# Patient Record
Sex: MALE | Race: WHITE
[De-identification: names, ages, dates, MRNs, and addresses within clinical notes are randomized per-mention and may not be internally consistent; named-entity substitution may affect disease eponyms.]

---

## 2018-02-01 ENCOUNTER — HOSPITAL ENCOUNTER (EMERGENCY)
Dept: HOSPITAL 80 - CED | Age: 28
Discharge: HOME | End: 2018-02-01
Payer: MEDICAID

## 2018-02-01 VITALS — DIASTOLIC BLOOD PRESSURE: 62 MMHG | HEART RATE: 65 BPM | SYSTOLIC BLOOD PRESSURE: 134 MMHG

## 2018-02-01 VITALS — TEMPERATURE: 98 F | RESPIRATION RATE: 18 BRPM

## 2018-02-01 VITALS — OXYGEN SATURATION: 96 %

## 2018-02-01 DIAGNOSIS — L29.9: Primary | ICD-10-CM

## 2018-02-01 LAB — PLATELET # BLD: 266 10^3/UL (ref 150–400)

## 2018-02-08 ENCOUNTER — HOSPITAL ENCOUNTER (EMERGENCY)
Dept: HOSPITAL 80 - FED | Age: 28
Discharge: HOME | End: 2018-02-08
Payer: MEDICAID

## 2018-02-08 VITALS
DIASTOLIC BLOOD PRESSURE: 76 MMHG | OXYGEN SATURATION: 95 % | TEMPERATURE: 97.5 F | RESPIRATION RATE: 16 BRPM | SYSTOLIC BLOOD PRESSURE: 124 MMHG | HEART RATE: 92 BPM
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
## 2018-02-08 DIAGNOSIS — S93.402A: Primary | ICD-10-CM

## 2018-02-08 PROCEDURE — L4386 NON-PNEUM WALK BOOT PRE CST: HCPCS

## 2018-02-08 NOTE — EDPHY
H & P


Stated Complaint: Poss broken/sprained L ankle


HPI/ROS: 





HPI





CHIEF COMPLAINT:  Left ankle pain, left ankle swelling.





HISTORY OF PRESENT ILLNESS:  Patient is a 27-year-old male, otherwise healthy 

no significant medical history does not take any daily medications he presents 

emergency room with left lateral ankle pain and swelling. He states he was 

playing soccer and rolled his ankle now has left lateral ankle pain swelling. 

He is able to bear weight but has discomfort.  Denies any other areas of pain. 

No knee pain. No foot pain.





Past Medical History:  No significant medical history





Past Surgical History:  No significant surgical history





Social History:  Denies daily use of drugs alcohol tobacco





Family History:  Noncontributory








ROS   


REVIEW OF SYSTEMS:


A comprehensive 10 point review of systems is otherwise negative aside from 

elements mentioned in the history of present illness.








Exam   


Constitutional   triage nursing summary reviewed, vital signs reviewed, awake/

alert. 


Eyes   normal conjunctivae and sclera, EOMI, PERRLA. 


HENT   normal inspection, atraumatic, moist mucus membranes, no epistaxis, neck 

supple/ no meningismus, no raccoon eyes. 


Respiratory   clear to auscultation bilaterally, normal breath sounds, no 

respiratory distress, no wheezing. 


Cardiovascular   rate normal, regular rhythm, no murmur, no edema, distal 

pulses normal. 


Gastrointestinal   soft, non-tender, no rebound, no guarding, normal bowel 

sounds, no distension, no pulsatile mass. 


Genitourinary   no CVA tenderness. 


Musculoskeletal left lower extremity:  Good distal pulse.  Good cap refill.  

Swelling and tenderness over the left lateral malleolus.  Full range of motion 

of left ankle.  no midline vertebral tenderness, full range of motion, no calf 

swelling, no tenderness of extremities, no meningismus, good pulses, 

neurovascularly intact.


Skin   pink, warm, & dry, no rash, skin atraumatic. 


Neurologic   awake, alert and oriented x 3, AAOx3, moves all 4 extremities 

equally, motor intact, sensory intact, CN II-XII intact, normal cerebellar, 

normal vision, normal speech. 


Psychiatric   normal mood/affect. 


Heme/Lymph/Immune   no lymphadenopathy.





Differential Diagnosis:  Includes but is not limited to in a particular order, 

ankle sprain high-grade, ankle contusion, ankle fracture, chip fracture, ankle 

dislocation





Medical Decision Making:  Plan for this patient x-ray left ankle, [place] for 

pain control here in emergency room, ice pack.





Re-evaluation:





Will plan on giving crutches, walking boot, Norco for pain control at home and 

ibuprofen.  Referral to Orthopedics.  He has high-grade ankle sprain.  Will 

need follow-up.








X-ray reviewed of the left ankle.  Soft tissue swelling noted. No dislocation.  

No fracture.  Image interpreted by myself.





Walking boot and crutches provided.





Orthopedic referral given.


Source: Patient





- Personal History


Current Tetanus/Diphtheria Vaccine: Yes


Current Tetanus Diphtheria and Acellular Pertussis (TDAP): Yes





- Medical/Surgical History


Hx Asthma: No


Hx Chronic Respiratory Disease: No


Hx Diabetes: No


Hx Cardiac Disease: No


Hx Renal Disease: No


Hx Cirrhosis: No


Hx Alcoholism: No


Hx HIV/AIDS: No


Hx Splenectomy or Spleen Trauma: No


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





- Social History


Smoking Status: Never smoked


Constitutional: 


 Initial Vital Signs











Temperature (C)  36.4 C   02/08/18 19:53


 


Heart Rate  92   02/08/18 19:53


 


Respiratory Rate  16   02/08/18 19:53


 


Blood Pressure  124/76 H  02/08/18 19:53


 


O2 Sat (%)  95   02/08/18 19:53








 











O2 Delivery Mode               Room Air














Allergies/Adverse Reactions: 


 





Penicillins Allergy (Verified 02/08/18 19:57)


 


sulfamethoxazole [From Septra] Allergy (Verified 02/08/18 19:57)


 


trimethoprim [From Septra] Allergy (Verified 02/08/18 19:57)


 








Home Medications: 














 Medication  Instructions  Recorded


 


Hydrocodone/APAP 5/325 [Norco 1 - 2 tab PO Q4H PRN #10 tab 02/08/18





5/325]  


 


Ibuprofen [Motrin (*)] 800 mg PO Q6-8PRN #10 tab 02/08/18














Medical Decision Making





- Data Points


Medications Given: 


 








Discontinued Medications





Hydrocodone Bitart/Acetaminophen (Norco 5/325mg Prepack#6)  1 btl TAKEHOME 

EDNOW ONE


   Stop: 02/08/18 20:24


   Last Admin: 02/08/18 20:37 Dose:  1 btl


Hydrocodone Bitart/Acetaminophen (Norco 5/325)  1 tab PO EDNOW ONE


   Stop: 02/08/18 20:24


   Last Admin: 02/08/18 20:39 Dose:  Not Given


Hydrocodone Bitart/Acetaminophen (Norco 5/325)  1 tab PO EDNOW ONE


   Stop: 02/08/18 20:24


   Last Admin: 02/08/18 20:37 Dose:  1 tab








Departure





- Departure


Disposition: Home, Routine, Self-Care


Clinical Impression: 


Ankle sprain


Qualifiers:


 Encounter type: initial encounter Involved ligament of ankle: unspecified 

ligament Laterality: left Qualified Code(s): S93.402A - Sprain of unspecified 

ligament of left ankle, initial encounter





Condition: Good


Instructions:  Ankle Sprain (ED)


Additional Instructions: 


1.  Elevate her leg.


2.  Ice her ankle.


3.  Ibuprofen for mild pain Norco for severe pain


4.  Follow up with Orthopedics.


5.  Crutches and walking boot for comfort and pain control


Referrals: 


NONE *PRIMARY CARE P,. [Primary Care Provider] - As per Instructions


Malick Myers MD [Medical Doctor] - As per Instructions


Prescriptions: 


Hydrocodone/APAP 5/325 [Norco 5/325] 1 - 2 tab PO Q4H PRN #10 tab


 PRN Reason: Pain, Moderate


Ibuprofen [Motrin (*)] 800 mg PO Q6-8PRN #10 tab

## 2021-12-27 NOTE — EDPHY
H & P


Stated Complaint: c/o internal fire like itching 


Time Seen by Provider: 02/01/18 10:22


HPI/ROS: 





CHIEF COMPLAINT:  Itching





HISTORY OF PRESENT ILLNESS:  The patient is a 27-year-old healthy man who comes 

to the emergency department because he states every night for the last 2 weeks 

around 1:00 a.m. he wakes up with itching and has trouble sleeping and he 

states that it is itching internally and feels like electrical impulses 

throughout his body.  No rashes. No discoloration.  No jaundice.  No nausea 

vomiting or diarrhea.  No abdominal pain.  No no urine symptoms.  The patient 

has not had a fever.  He states that on January 1st he was in [place] and 

received some type of cleanse for his gallbladder and liver where he has frog 

venom injected into his skin.  He did not vomit as he expected to and felt fine 

for about a week but then began having the symptoms described above.  He is 

concerned about his liver and gallbladder and wants us to draw his blood to 

check.








REVIEW OF SYSTEMS:


Constitutional:  denies: chills, fever, recent illness, recent injury


EENTM: denies: blurred vision, double vision, nose congestion


Respiratory: denies: cough, shortness of breath


Cardiac: denies: chest pain, irregular heart rate, lightheadedness, palpitations


Gastrointestinal/Abdominal: denies: abdominal pain, diarrhea, nausea, vomiting, 

blood streaked stools


Genitourinary: denies: dysuria, frequency, hematuria, pain


Musculoskeletal: denies: joint pain, muscle pain


Skin:  See HPI denies: lesions, rash, jaundice, bruising


Neurological: denies: headache, numbness, paresthesia, tingling, dizziness, 

weakness


Hematologic/Lymphatic: denies: blood clots, easy bleeding, easy bruising


Immunologic/allergic: denies: HIV/AIDS, transplant








EXAM:


GENERAL:  Well-appearing, well-nourished and in no acute distress.


HEAD:  Atraumatic, normocephalic.


EYES:  Pupils equal round and reactive to light, extraocular movements intact, 

sclera anicteric, conjunctiva are normal.


ENT:  TMs normal, nares patent, oropharynx clear without exudates.  Moist 

mucous membranes.


NECK:  Normal range of motion, supple without lymphadenopathy or JVD.


LUNGS:  Breath sounds clear to auscultation bilaterally and equal.  No wheezes 

rales or rhonchi.


HEART:  Regular rate and rhythm without murmurs, rubs or gallops.


ABDOMEN:  Soft, nontender, normoactive bowel sounds.  No guarding, no rebound.  

No masses appreciated.


BACK:  No CVA tenderness, no spinal tenderness, step-offs or deformities


EXTREMITIES:  Normal range of motion, no pitting or edema.  No clubbing or 

cyanosis.


NEUROLOGICAL:  Cranial nerves II through XII grossly intact.  Normal speech, 

normal gait.  5/5 strength, normal movement in all extremities, normal sensation


PSYCH:  Normal mood, normal affect.


SKIN:  Warm, dry, normal turgor, no visible rashes or lesions.








Source: Patient


Exam Limitations: No limitations





- Personal History


Current Tetanus Diphtheria and Acellular Pertussis (TDAP): Yes





- Medical/Surgical History


Hx Asthma: No


Hx Chronic Respiratory Disease: No


Hx Diabetes: No


Hx Cardiac Disease: No


Hx Renal Disease: No


Hx Cirrhosis: No


Hx Alcoholism: No


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





- Family History


Significant Family History: No pertinent family hx





- Social History


Smoking Status: Never smoked


Alcohol Use: Sober


Drug Use: None


Constitutional: 


 Initial Vital Signs











Temperature (C)  36.6 C   02/01/18 10:10


 


Heart Rate  62   02/01/18 10:10


 


Respiratory Rate  18   02/01/18 10:10


 


Blood Pressure  123/84 H  02/01/18 10:10


 


O2 Sat (%)  98   02/01/18 10:10








 











O2 Delivery Mode               Room Air














Allergies/Adverse Reactions: 


 





No Known Allergies Allergy (Unverified 02/01/18 10:09)


 








Home Medications: 














 Medication  Instructions  Recorded


 


NK [No Known Home Meds]  02/01/18














Medical Decision Making


ED Course/Re-evaluation: 





11:25 a.m. the patient's lab work is reassuring.  He is happy with this.  I 

suggested he used CeraVe lotion, a humidifier possibly Benadryl for his itchy 

skin.  He will also follow up with his primary.  He declines further workup or 

testing at this time.


Differential Diagnosis: 





Partial list of the Differential diagnosis considered include but were not 

limited to;  dry skin, allergic reaction, and although unlikely based on the 

history and physical exam, I also considered liver disease, renal disease.  I 

discussed these differential diagnoses and the plan with the patient as well as 

the usual and expected course.  The patient understands that the diagnosis is 

provisional and that in medicine we are not always correct and that further 

workup is often warranted.  Usual and customary warnings were given.  All of 

the patient's questions were answered.  The patient was instructed to return to 

the emergency department should the symptoms at all worsen or return, otherwise 

to followup with the physician as we discussed.





- Data Points


Laboratory Results: 


 Laboratory Results





 02/01/18 10:50 





 02/01/18 10:50 





 











  02/01/18 02/01/18





  10:50 10:50


 


WBC    6.06 10^3/uL 10^3/uL





    (3.80-9.50) 


 


RBC    6.01 10^6/uL 10^6/uL





    (4.40-6.38) 


 


Hgb    17.7 g/dL H g/dL





    (13.7-17.5) 


 


Hct    50.3 % %





    (40.0-51.0) 


 


MCV    83.7 fL fL





    (81.5-99.8) 


 


MCH    29.5 pg pg





    (27.9-34.1) 


 


MCHC    35.2 g/dL g/dL





    (32.4-36.7) 


 


RDW    12.3 % %





    (11.5-15.2) 


 


Plt Count    266 10^3/uL 10^3/uL





    (150-400) 


 


MPV    9.7 fL fL





    (8.7-11.7) 


 


Neut % (Auto)    49.4 % %





    (39.3-74.2) 


 


Lymph % (Auto)    31.5 % %





    (15.0-45.0) 


 


Mono % (Auto)    7.6 % %





    (4.5-13.0) 


 


Eos % (Auto)    9.6 % H %





    (0.6-7.6) 


 


Baso % (Auto)    1.7 % %





    (0.3-1.7) 


 


Nucleat RBC Rel Count    0.0 % %





    (0.0-0.2) 


 


Absolute Neuts (auto)    3.00 10^3/uL 10^3/uL





    (1.70-6.50) 


 


Absolute Lymphs (auto)    1.91 10^3/uL 10^3/uL





    (1.00-3.00) 


 


Absolute Monos (auto)    0.46 10^3/uL 10^3/uL





    (0.30-0.80) 


 


Absolute Eos (auto)    0.58 10^3/uL H 10^3/uL





    (0.03-0.40) 


 


Absolute Basos (auto)    0.10 10^3/uL 10^3/uL





    (0.02-0.10) 


 


Absolute Nucleated RBC    0.00 10^3/uL 10^3/uL





    (0-0.01) 


 


Immature Gran %    0.2 % %





    (0.0-1.1) 


 


Immature Gran #    0.01 10^3/uL 10^3/uL





    (0.00-0.10) 


 


Sodium  142 mEq/L mEq/L  





   (135-145)  


 


Potassium  4.5 mEq/L mEq/L  





   (3.5-5.2)  


 


Chloride  98 mEq/L mEq/L  





   ()  


 


Carbon Dioxide  29 mEq/l mEq/l  





   (22-31)  


 


Anion Gap  15 mEq/L mEq/L  





   (8-16)  


 


BUN  13 mg/dL mg/dL  





   (7-23)  


 


Creatinine  1.1 mg/dL mg/dL  





   (0.7-1.3)  


 


Estimated GFR  > 60   





   


 


Glucose  76 mg/dL mg/dL  





   ()  


 


Calcium  9.9 mg/dL mg/dL  





   (8.5-10.4)  


 


Total Bilirubin  0.9 mg/dL mg/dL  





   (0.1-1.4)  


 


Conjugated Bilirubin  0.1 mg/dL mg/dL  





   (0.0-0.5)  


 


Unconjugated Bilirubin  0.8 mg/dL mg/dL  





   (0.0-1.1)  


 


AST  28 IU/L IU/L  





   (17-59)  


 


ALT  32 IU/L IU/L  





   (21-72)  


 


Alkaline Phosphatase  52 IU/L IU/L  





   ()  


 


Total Protein  7.1 g/dL g/dL  





   (6.3-8.2)  


 


Albumin  4.3 g/dL g/dL  





   (3.5-5.0)  














Departure





- Departure


Disposition: Home, Routine, Self-Care


Clinical Impression: 


 Pruritus





Condition: Fair


Instructions:  Itchy Skin (ED)


Referrals: 


NONE *PRIMARY CARE P,. [Primary Care Provider] - As per Instructions


Gabe Manrique MD [Medical Doctor] - 2-3 days, call for appt. minimal assistance required